# Patient Record
Sex: FEMALE | Race: WHITE | NOT HISPANIC OR LATINO | Employment: FULL TIME | URBAN - METROPOLITAN AREA
[De-identification: names, ages, dates, MRNs, and addresses within clinical notes are randomized per-mention and may not be internally consistent; named-entity substitution may affect disease eponyms.]

---

## 2023-04-28 ENCOUNTER — TELEMEDICINE (OUTPATIENT)
Dept: PRIMARY CARE | Facility: CLINIC | Age: 38
End: 2023-04-28
Payer: COMMERCIAL

## 2023-04-28 DIAGNOSIS — J32.9 RECURRENT SINUSITIS: Primary | ICD-10-CM

## 2023-04-28 PROBLEM — I47.10 SVT (SUPRAVENTRICULAR TACHYCARDIA) (CMS-HCC): Status: ACTIVE | Noted: 2023-04-28

## 2023-04-28 PROCEDURE — 99204 OFFICE O/P NEW MOD 45 MIN: CPT | Performed by: STUDENT IN AN ORGANIZED HEALTH CARE EDUCATION/TRAINING PROGRAM

## 2023-04-28 RX ORDER — NADOLOL 20 MG/1
10 TABLET ORAL DAILY
COMMUNITY
Start: 2021-09-28 | End: 2024-04-08 | Stop reason: SDUPTHER

## 2023-04-28 RX ORDER — AMOXICILLIN AND CLAVULANATE POTASSIUM 875; 125 MG/1; MG/1
875 TABLET, FILM COATED ORAL 2 TIMES DAILY
Qty: 20 TABLET | Refills: 0 | Status: SHIPPED | OUTPATIENT
Start: 2023-04-28 | End: 2023-05-08

## 2023-04-28 ASSESSMENT — ENCOUNTER SYMPTOMS
SINUS PAIN: 1
SINUS PRESSURE: 1
APPETITE CHANGE: 0
SORE THROAT: 0

## 2023-04-28 ASSESSMENT — LIFESTYLE VARIABLES: HISTORY_OF_SMOKING: I HAVE NEVER SMOKED

## 2023-04-28 NOTE — PROGRESS NOTES
Patient Name:  Terri Donald  MRN:  [id number]  :  1985    Subjective   Patient ID: Terri Donald is a 38 y.o. female who presents for No chief complaint on file..    HPI     39 yo female presents with sinus concerns     New patient has not seen primary care through   All histories reviewed with patient  Is establishing with a continuity PCP upcoming     7-10 days of worsening sinus pain and pressure   Negative COVID test  Altered taste and smell   Recurrent in the past  Always treated with z packs    Review of Systems   Constitutional:  Negative for appetite change.   HENT:  Positive for congestion, sinus pressure and sinus pain. Negative for ear pain and sore throat.    Eyes:  Negative for visual disturbance.   Allergic/Immunologic: Negative for immunocompromised state.     Objective   There were no vitals taken for this visit.    Physical Exam  Constitutional:       Appearance: Normal appearance.   HENT:      Head: Normocephalic and atraumatic.      Ears:      Comments: Sinus pressure to palpation     Nose: Congestion present.      Mouth/Throat:      Pharynx: No posterior oropharyngeal erythema.   Eyes:      Extraocular Movements: Extraocular movements intact.   Neurological:      Mental Status: She is alert.     Assessment/Plan   1. Recurrent sinusitis  amoxicillin-pot clavulanate (Augmentin) 875-125 mg tablet        Patient has exhibited symptoms for more than 7 days with worsened features and symptoms including one of the clinical practice guidelines to indicate bacterial sinusitis (purulent nasal drainage, unilateral nasal obstruction, facial pain/pressure, and/or anosmia).In patients with rhinosinusitis, antibiotic therapy is recommended if the above criteria are met.   Patient is to be treated with: augmentin   Patient is advised on recommended duration of treatment, allergies are checked with the patient to assure no hx of reaction, patient advised on possible side effects of this medication.   Patient  may also use symptomatic relief for this condition including nasal saline irrigation, intranasal corticosteroids.

## 2023-05-03 ASSESSMENT — PROMIS GLOBAL HEALTH SCALE
RATE_AVERAGE_PAIN: 0
CARRYOUT_PHYSICAL_ACTIVITIES: COMPLETELY
RATE_QUALITY_OF_LIFE: EXCELLENT
RATE_PHYSICAL_HEALTH: GOOD
EMOTIONAL_PROBLEMS: NEVER
RATE_SOCIAL_SATISFACTION: VERY GOOD
RATE_AVERAGE_FATIGUE: MILD
RATE_MENTAL_HEALTH: VERY GOOD
CARRYOUT_SOCIAL_ACTIVITIES: EXCELLENT
RATE_GENERAL_HEALTH: VERY GOOD

## 2023-05-04 PROBLEM — J32.9 SINUSITIS: Status: RESOLVED | Noted: 2023-04-28 | Resolved: 2023-05-04

## 2023-05-05 ENCOUNTER — OFFICE VISIT (OUTPATIENT)
Dept: PRIMARY CARE | Facility: CLINIC | Age: 38
End: 2023-05-05
Payer: COMMERCIAL

## 2023-05-05 VITALS
DIASTOLIC BLOOD PRESSURE: 68 MMHG | RESPIRATION RATE: 16 BRPM | BODY MASS INDEX: 34.87 KG/M2 | WEIGHT: 217 LBS | TEMPERATURE: 97.3 F | HEART RATE: 74 BPM | OXYGEN SATURATION: 98 % | SYSTOLIC BLOOD PRESSURE: 102 MMHG | HEIGHT: 66 IN

## 2023-05-05 DIAGNOSIS — J01.00 ACUTE NON-RECURRENT MAXILLARY SINUSITIS: ICD-10-CM

## 2023-05-05 DIAGNOSIS — Z00.00 PHYSICAL EXAM: Primary | ICD-10-CM

## 2023-05-05 DIAGNOSIS — I47.10 SVT (SUPRAVENTRICULAR TACHYCARDIA) (CMS-HCC): ICD-10-CM

## 2023-05-05 PROCEDURE — 1036F TOBACCO NON-USER: CPT | Performed by: FAMILY MEDICINE

## 2023-05-05 PROCEDURE — 3008F BODY MASS INDEX DOCD: CPT | Performed by: FAMILY MEDICINE

## 2023-05-05 PROCEDURE — 99214 OFFICE O/P EST MOD 30 MIN: CPT | Performed by: FAMILY MEDICINE

## 2023-05-05 NOTE — PROGRESS NOTES
"Subjective   Patient ID: Terri Donald is a 38 y.o. female who presents for Establish Care (Currently on Augmentin).  Covid vax: x 2  pap:  per pt  Lmp: now    Took methotrexate for ectopic  2021 covid mild covid    HPI  Patient Active Problem List   Diagnosis    SVT (supraventricular tachycardia) (CMS/[facility])       Past Surgical History:   Procedure Laterality Date     SECTION, CLASSIC      2017    NASAL SEPTUM SURGERY         Review of Systems no sz no mi no cva    This patient has   NO history of recent Covid nor flu symptoms,  NO Fever nor chills,  NO Chest pain, shortness of breath nor paroxysmal nocturnal dyspnea,  NO Nausea, vomiting, nor diarrhea,  NO Hematochezia nor melena,  NO Dysuria, hematuria, nor new incontinence issues  NO new severe headaches nor neurological complaints,  NO new issues with anxiety nor depression nor new psychiatric complaints,  NO suicidal nor homicidal ideations.     OBJECTIVE:  /68   Pulse 74   Temp 36.3 °C (97.3 °F) (Temporal)   Resp 16   Ht 1.664 m (5' 5.5\")   Wt 98.4 kg (217 lb)   LMP 2023 (Exact Date)   SpO2 98%   BMI 35.56 kg/m²      General:  alert, oriented, no acute distress.  No obvious skin rashes noted.   No gait disturbance noted.    Mood is pleasant, not tearful, no signs of emotional distress.  Not appearing intoxicated or altered.   No voiced delusions,   Normal, appropriate behavior.    HEENT: Normocephalic, atraumatic,   Pupils round, reactive to light  Extraocular motions intact and wnl  Tympanic membranes normal    Neck: no nuchal rigidity  No masses palpable.  No carotid bruits.  No thyromegaly.    Respiratory: Equal breath sounds  No wheezes,    rales,    nor rhonchi  No respiratory distress.    Heart: Regular rate and rhythm, no    murmurs  no rubs/gallops    Abdomen: no masses palpable, nontender, no rebound nor guarding .    Extremities: NO cyanosis noted, no clubbing.   No edema noted.  2+dorsalis pedis " pulses.    Normal-not antalgic, steady gait.    No visits with results within 3 Month(s) from this visit.   Latest known visit with results is:   Legacy Encounter on 06/07/2021   Component Date Value Ref Range Status    hCG Quantitative 06/07/2021 <3  IU/L Final    Comment:  Low-level positive HCG results can be seen in early pregnancy, in    tonny- or post-menopausal females due to normal pituitary HCG    production, or with analytic interference. Repeat testing in 48-72   hours can aid in assessing for pregnancy as results should double   in this time period. FSH measurement is recommended in tonny- or   post-menopausal females as concurrent elevation of FSH can support    pituitary production as the source of the HCG elevation.   .   Total HCG measurement is performed using the [employer]   immunoassay which detects intact HCG and free beta HCG subunit.   This test is not indicated for use as a tumor marker.   HCG testing is performed using a different test methodology at [facility] than other [facility]. Direct result comparison   should only be made within the same method.         REF VALUES  NONPREGNANT FEMALE <5  MALES              <5          Assessment/Plan     Problem List Items Addressed This Visit          Circulatory    SVT (supraventricular tachycardia) (CMS/HCC)     Other Visit Diagnoses       Physical exam    -  Primary    Acute non-recurrent maxillary sinusitis                Sees dr rapp and dr ISHA webb [facility] general  Echo planned  Labs due  The patient is aware that results will be forthcoming of ALL planned labs and or tests. If no results are received on my chart or by letter within 1 - 3 weeks, the patient is aware they need to call to obtain results as this is not usual.

## 2023-10-20 ENCOUNTER — OFFICE VISIT (OUTPATIENT)
Dept: PRIMARY CARE | Facility: CLINIC | Age: 38
End: 2023-10-20
Payer: COMMERCIAL

## 2023-10-20 VITALS
DIASTOLIC BLOOD PRESSURE: 77 MMHG | TEMPERATURE: 98.6 F | BODY MASS INDEX: 34.27 KG/M2 | HEIGHT: 66 IN | RESPIRATION RATE: 16 BRPM | WEIGHT: 213.2 LBS | HEART RATE: 81 BPM | SYSTOLIC BLOOD PRESSURE: 108 MMHG | OXYGEN SATURATION: 98 %

## 2023-10-20 DIAGNOSIS — J20.9 ACUTE BRONCHITIS, UNSPECIFIED ORGANISM: ICD-10-CM

## 2023-10-20 DIAGNOSIS — J06.9 UPPER RESPIRATORY TRACT INFECTION, UNSPECIFIED TYPE: Primary | ICD-10-CM

## 2023-10-20 DIAGNOSIS — J40 BRONCHITIS: ICD-10-CM

## 2023-10-20 PROCEDURE — 3008F BODY MASS INDEX DOCD: CPT | Performed by: PHYSICIAN ASSISTANT

## 2023-10-20 PROCEDURE — 1036F TOBACCO NON-USER: CPT | Performed by: PHYSICIAN ASSISTANT

## 2023-10-20 PROCEDURE — 87634 RSV DNA/RNA AMP PROBE: CPT

## 2023-10-20 PROCEDURE — 87636 SARSCOV2 & INF A&B AMP PRB: CPT

## 2023-10-20 PROCEDURE — 99213 OFFICE O/P EST LOW 20 MIN: CPT | Performed by: PHYSICIAN ASSISTANT

## 2023-10-20 RX ORDER — AMOXICILLIN AND CLAVULANATE POTASSIUM 875; 125 MG/1; MG/1
875 TABLET, FILM COATED ORAL
Qty: 20 TABLET | Refills: 0 | Status: SHIPPED | OUTPATIENT
Start: 2023-10-20 | End: 2023-10-30

## 2023-10-20 ASSESSMENT — ENCOUNTER SYMPTOMS
SINUS PRESSURE: 1
SORE THROAT: 1
SINUS PAIN: 1

## 2023-10-20 NOTE — PROGRESS NOTES
"Subjective   Patient ID: Terri Donald is a 38 y.o. female who presents for Illness (Dr. Lee patient is being seen today for possible bronchitis. Patient is concerned that could be turning into pneumonia. Symptoms are cough/spitting up phlegm (yellowish-green), sore throat, runny nose, and drainage back of throat. ).    HPI     URI sxs:   Last a couple days coughing and bringing up yellow mucous   Was sick last week as well   No fever   No known exposures   But, her son in school   No SOB but sometimes heaviness  Had pneumonia back in 2016 and it felt like this in her chest   Txs tried Alyson seltzer cold   Sore throat  PND     Review of Systems   HENT:  Positive for postnasal drip, sinus pressure, sinus pain and sore throat.        Objective   /77 (BP Location: Left arm, Patient Position: Sitting)   Pulse 81   Temp 37 °C (98.6 °F)   Resp 16   Ht 1.676 m (5' 6\")   Wt 96.7 kg (213 lb 3.2 oz)   LMP 10/13/2023 (Exact Date) Comment: currently on- ending  SpO2 98%   BMI 34.41 kg/m²     Physical Exam  Vitals reviewed.   Constitutional:       General: She is not in acute distress.     Appearance: Normal appearance. She is not toxic-appearing.   HENT:      Right Ear: Tympanic membrane and ear canal normal.      Left Ear: Tympanic membrane and ear canal normal.      Nose: Congestion present.   Eyes:      Conjunctiva/sclera: Conjunctivae normal.   Cardiovascular:      Rate and Rhythm: Normal rate and regular rhythm.   Pulmonary:      Effort: Pulmonary effort is normal.      Breath sounds: No wheezing or rhonchi.   Lymphadenopathy:      Cervical: No cervical adenopathy.   Neurological:      Mental Status: She is alert.         Assessment/Plan   Problem List Items Addressed This Visit    None  Visit Diagnoses         Codes    Upper respiratory tract infection, unspecified type    -  Primary J06.9    Relevant Medications    amoxicillin-pot clavulanate (Augmentin) 875-125 mg tablet    Other Relevant Orders    " Sars-CoV-2 PCR, Symptomatic    Influenza A, and B PCR    Acute bronchitis, unspecified organism     J20.9    Relevant Medications    amoxicillin-pot clavulanate (Augmentin) 875-125 mg tablet    Bronchitis     J40    Relevant Orders    RSV PCR

## 2023-10-21 LAB
FLUAV RNA RESP QL NAA+PROBE: NOT DETECTED
FLUBV RNA RESP QL NAA+PROBE: NOT DETECTED
RSV RNA RESP QL NAA+PROBE: NOT DETECTED
SARS-COV-2 RNA RESP QL NAA+PROBE: NOT DETECTED

## 2024-03-21 ENCOUNTER — OFFICE VISIT (OUTPATIENT)
Dept: OBSTETRICS AND GYNECOLOGY | Facility: CLINIC | Age: 39
End: 2024-03-21
Payer: COMMERCIAL

## 2024-03-21 VITALS
SYSTOLIC BLOOD PRESSURE: 126 MMHG | WEIGHT: 220.5 LBS | BODY MASS INDEX: 43.29 KG/M2 | HEIGHT: 60 IN | DIASTOLIC BLOOD PRESSURE: 84 MMHG

## 2024-03-21 DIAGNOSIS — Z01.419 WELL FEMALE EXAM WITH ROUTINE GYNECOLOGICAL EXAM: ICD-10-CM

## 2024-03-21 PROCEDURE — 88175 CYTOPATH C/V AUTO FLUID REDO: CPT

## 2024-03-21 PROCEDURE — 99385 PREV VISIT NEW AGE 18-39: CPT | Performed by: OBSTETRICS & GYNECOLOGY

## 2024-03-21 PROCEDURE — 87661 TRICHOMONAS VAGINALIS AMPLIF: CPT

## 2024-03-21 PROCEDURE — 87624 HPV HI-RISK TYP POOLED RSLT: CPT

## 2024-03-21 PROCEDURE — 1036F TOBACCO NON-USER: CPT | Performed by: OBSTETRICS & GYNECOLOGY

## 2024-03-21 PROCEDURE — 88141 CYTOPATH C/V INTERPRET: CPT | Performed by: PATHOLOGY

## 2024-03-21 PROCEDURE — 87800 DETECT AGNT MULT DNA DIREC: CPT

## 2024-03-21 PROCEDURE — 3008F BODY MASS INDEX DOCD: CPT | Performed by: OBSTETRICS & GYNECOLOGY

## 2024-03-21 ASSESSMENT — PATIENT HEALTH QUESTIONNAIRE - PHQ9
1. LITTLE INTEREST OR PLEASURE IN DOING THINGS: NOT AT ALL
SUM OF ALL RESPONSES TO PHQ9 QUESTIONS 1 & 2: 0
2. FEELING DOWN, DEPRESSED OR HOPELESS: NOT AT ALL

## 2024-03-21 NOTE — PROGRESS NOTES
Subjective   Patient ID: Terri Donald is a 39 y.o. female who presents for Annual Exam.    Last pap: 6/14/22 at [facility] normal    3/11/16 Ascus HPV+  [place] 7/8/16 Lgsil kirstie at [facility]. LEEP in 2008     Last mamm: N/A  LMP:03/09/2024   Contraception: none  Sexually active: not at this time  Self breast exam: sometimes  Diet:not very balanced  Exercise: yes    HPI  Doing well. No complaints.     Review of Systems  Neg   Objective   Physical Exam  Physical Exam         Appearance: Normal appearance. Affect normal and alert  Pulmonary:      Effort: Pulmonary effort is normal. Breath sounds clear  Skin: no rashes or lesions   Breasts:     Breasts bilaterally are symmetrical. No masses or axillary adenopathy. No skin or nipple changes    Abdominal:     Abdomen is flat, soft, nontender. No distension. No mass palpated.      Genitourinary:     Labia: no skin lesions or rash       Urethra: No lesions.      Bladder with no prolapse     Vagina: No discharge, mucosa is pink with no lesions.     Cervix:    mobile and nontender no discharge     Uterus:   Not enlarged, small, nontender.      Adnexa: Bilaterally with  No mass or tenderness.            Extremities:  Nontender, no edema. Normal range of motion    Assessment/Plan   Diagnoses and all orders for this visit:  Well female exam with routine gynecological exam  -     THINPREP PAP TEST    Wants GC CHLY screen also.    MD Coby Darnell, ADELA 03/21/24 2:46 PM

## 2024-03-26 LAB
C TRACH RRNA SPEC QL NAA+PROBE: NEGATIVE
N GONORRHOEA DNA SPEC QL PROBE+SIG AMP: NEGATIVE
T VAGINALIS RRNA SPEC QL NAA+PROBE: NEGATIVE

## 2024-04-03 LAB
CYTOLOGY CMNT CVX/VAG CYTO-IMP: NORMAL
HPV HR 12 DNA GENITAL QL NAA+PROBE: NEGATIVE
HPV HR GENOTYPES PNL CVX NAA+PROBE: POSITIVE
HPV16 DNA SPEC QL NAA+PROBE: NEGATIVE
HPV18 DNA SPEC QL NAA+PROBE: POSITIVE
LAB AP HPV GENOTYPE QUESTION: YES
LAB AP HPV HR: NORMAL
LAB AP PAP ADDITIONAL TESTS: NORMAL
LABORATORY COMMENT REPORT: NORMAL
PATH REPORT.TOTAL CANCER: NORMAL

## 2024-04-04 PROBLEM — B97.7 HPV (HUMAN PAPILLOMA VIRUS) INFECTION: Status: ACTIVE | Noted: 2024-04-04

## 2024-04-08 ENCOUNTER — LAB (OUTPATIENT)
Dept: LAB | Facility: LAB | Age: 39
End: 2024-04-08
Payer: COMMERCIAL

## 2024-04-08 ENCOUNTER — OFFICE VISIT (OUTPATIENT)
Dept: PRIMARY CARE | Facility: CLINIC | Age: 39
End: 2024-04-08
Payer: COMMERCIAL

## 2024-04-08 VITALS
TEMPERATURE: 96.7 F | SYSTOLIC BLOOD PRESSURE: 112 MMHG | WEIGHT: 222.6 LBS | OXYGEN SATURATION: 98 % | HEART RATE: 81 BPM | HEIGHT: 66 IN | RESPIRATION RATE: 16 BRPM | DIASTOLIC BLOOD PRESSURE: 76 MMHG | BODY MASS INDEX: 35.77 KG/M2

## 2024-04-08 DIAGNOSIS — I47.11 INAPPROPRIATE SINUS TACHYCARDIA (CMS-HCC): ICD-10-CM

## 2024-04-08 DIAGNOSIS — I47.10 SVT (SUPRAVENTRICULAR TACHYCARDIA) (CMS-HCC): ICD-10-CM

## 2024-04-08 DIAGNOSIS — Z00.00 PHYSICAL EXAM: Primary | ICD-10-CM

## 2024-04-08 DIAGNOSIS — Z00.00 PHYSICAL EXAM: ICD-10-CM

## 2024-04-08 LAB
ALBUMIN SERPL BCP-MCNC: 4.4 G/DL (ref 3.4–5)
ALP SERPL-CCNC: 67 U/L (ref 33–110)
ALT SERPL W P-5'-P-CCNC: 16 U/L (ref 7–45)
ANION GAP SERPL CALC-SCNC: 11 MMOL/L (ref 10–20)
AST SERPL W P-5'-P-CCNC: 16 U/L (ref 9–39)
BASOPHILS # BLD AUTO: 0.03 X10*3/UL (ref 0–0.1)
BASOPHILS NFR BLD AUTO: 0.5 %
BILIRUB SERPL-MCNC: 0.5 MG/DL (ref 0–1.2)
BUN SERPL-MCNC: 9 MG/DL (ref 6–23)
CALCIUM SERPL-MCNC: 9.6 MG/DL (ref 8.6–10.3)
CHLORIDE SERPL-SCNC: 105 MMOL/L (ref 98–107)
CHOLEST SERPL-MCNC: 207 MG/DL (ref 0–199)
CHOLESTEROL/HDL RATIO: 4.3
CO2 SERPL-SCNC: 27 MMOL/L (ref 21–32)
CREAT SERPL-MCNC: 0.7 MG/DL (ref 0.5–1.05)
EGFRCR SERPLBLD CKD-EPI 2021: >90 ML/MIN/1.73M*2
EOSINOPHIL # BLD AUTO: 0.08 X10*3/UL (ref 0–0.7)
EOSINOPHIL NFR BLD AUTO: 1.3 %
ERYTHROCYTE [DISTWIDTH] IN BLOOD BY AUTOMATED COUNT: 13.2 % (ref 11.5–14.5)
EST. AVERAGE GLUCOSE BLD GHB EST-MCNC: 114 MG/DL
GLUCOSE SERPL-MCNC: 94 MG/DL (ref 74–99)
HBA1C MFR BLD: 5.6 %
HCT VFR BLD AUTO: 39.9 % (ref 36–46)
HDLC SERPL-MCNC: 48.2 MG/DL
HGB BLD-MCNC: 13.3 G/DL (ref 12–16)
IMM GRANULOCYTES # BLD AUTO: 0.01 X10*3/UL (ref 0–0.7)
IMM GRANULOCYTES NFR BLD AUTO: 0.2 % (ref 0–0.9)
LDLC SERPL CALC-MCNC: 125 MG/DL
LYMPHOCYTES # BLD AUTO: 1.6 X10*3/UL (ref 1.2–4.8)
LYMPHOCYTES NFR BLD AUTO: 25.2 %
MCH RBC QN AUTO: 30.9 PG (ref 26–34)
MCHC RBC AUTO-ENTMCNC: 33.3 G/DL (ref 32–36)
MCV RBC AUTO: 93 FL (ref 80–100)
MONOCYTES # BLD AUTO: 0.25 X10*3/UL (ref 0.1–1)
MONOCYTES NFR BLD AUTO: 3.9 %
NEUTROPHILS # BLD AUTO: 4.38 X10*3/UL (ref 1.2–7.7)
NEUTROPHILS NFR BLD AUTO: 68.9 %
NON HDL CHOLESTEROL: 159 MG/DL (ref 0–149)
NRBC BLD-RTO: 0 /100 WBCS (ref 0–0)
PLATELET # BLD AUTO: 205 X10*3/UL (ref 150–450)
POTASSIUM SERPL-SCNC: 4.4 MMOL/L (ref 3.5–5.3)
PROT SERPL-MCNC: 7.7 G/DL (ref 6.4–8.2)
RBC # BLD AUTO: 4.31 X10*6/UL (ref 4–5.2)
SODIUM SERPL-SCNC: 139 MMOL/L (ref 136–145)
T4 FREE SERPL-MCNC: 0.69 NG/DL (ref 0.61–1.12)
TRIGL SERPL-MCNC: 170 MG/DL (ref 0–149)
TSH SERPL-ACNC: 1.91 MIU/L (ref 0.44–3.98)
VLDL: 34 MG/DL (ref 0–40)
WBC # BLD AUTO: 6.4 X10*3/UL (ref 4.4–11.3)

## 2024-04-08 PROCEDURE — 99395 PREV VISIT EST AGE 18-39: CPT | Performed by: FAMILY MEDICINE

## 2024-04-08 PROCEDURE — 85025 COMPLETE CBC W/AUTO DIFF WBC: CPT

## 2024-04-08 PROCEDURE — 3008F BODY MASS INDEX DOCD: CPT | Performed by: FAMILY MEDICINE

## 2024-04-08 PROCEDURE — 83036 HEMOGLOBIN GLYCOSYLATED A1C: CPT

## 2024-04-08 PROCEDURE — 1036F TOBACCO NON-USER: CPT | Performed by: FAMILY MEDICINE

## 2024-04-08 PROCEDURE — 84439 ASSAY OF FREE THYROXINE: CPT

## 2024-04-08 PROCEDURE — 36415 COLL VENOUS BLD VENIPUNCTURE: CPT

## 2024-04-08 PROCEDURE — 80053 COMPREHEN METABOLIC PANEL: CPT

## 2024-04-08 PROCEDURE — 80061 LIPID PANEL: CPT

## 2024-04-08 PROCEDURE — 84443 ASSAY THYROID STIM HORMONE: CPT

## 2024-04-08 RX ORDER — SEMAGLUTIDE 0.25 MG/.5ML
0.25 INJECTION, SOLUTION SUBCUTANEOUS
Qty: 2 ML | Refills: 3 | Status: SHIPPED | OUTPATIENT
Start: 2024-04-14 | End: 2024-04-09 | Stop reason: SDUPTHER

## 2024-04-08 RX ORDER — NADOLOL 20 MG/1
10 TABLET ORAL DAILY
Qty: 45 TABLET | Refills: 3 | Status: SHIPPED | OUTPATIENT
Start: 2024-04-08

## 2024-04-08 RX ORDER — AMOXICILLIN AND CLAVULANATE POTASSIUM 875; 125 MG/1; MG/1
1 TABLET, FILM COATED ORAL 2 TIMES DAILY
COMMUNITY
Start: 2024-03-28 | End: 2024-04-08 | Stop reason: ALTCHOICE

## 2024-04-08 NOTE — PROGRESS NOTES
Yearly check up.      C19: Moderna x 2   Flu: DECLINE   PAP: 03/2024- WNL, HPV POSITIVE  LMP: 03/09/2024

## 2024-04-08 NOTE — PROGRESS NOTES
"Subjective   Patient ID: Terri Donald is a 39 y.o. female who presents for CHECK UP (Patient states no questions or concerns at this time, here for yearly check up. ).  Had remote svt  And inappro sinus tachy    HPI  Patient Active Problem List   Diagnosis    SVT (supraventricular tachycardia) (CMS/HCC)    HPV (human papilloma virus) infection       Past Surgical History:   Procedure Laterality Date    CERVICAL BIOPSY  W/ LOOP ELECTRODE EXCISION       SECTION, CLASSIC  2017     SECTION, LOW TRANSVERSE  2017    COLPOSCOPY  2018    NASAL SEPTUM SURGERY  2012       Review of Systems  This patient has  NO history of seizures/ CAD or CVA    NO history of recent Covid nor flu symptoms,  NO Fever nor chills,  NO Chest pain, shortness of breath nor paroxysmal nocturnal dyspnea,  NO Nausea, vomiting, nor diarrhea,  NO Hematochezia nor melena,  NO Dysuria, hematuria, nor new incontinence issues  NO new severe headaches nor neurological complaints,  NO new issues with anxiety nor depression nor new psychiatric complaints,  NO suicidal nor homicidal ideations.     OBJECTIVE:  /76 (BP Location: Left arm, Patient Position: Sitting, BP Cuff Size: Large adult)   Pulse 81   Temp 35.9 °C (96.7 °F)   Resp 16   Ht 1.676 m (5' 6\")   Wt 101 kg (222 lb 9.6 oz)   LMP 2023 (Exact Date)   SpO2 98%   BMI 35.93 kg/m²      General:  alert, oriented, no acute distress.  No obvious skin rashes noted.   No gait disturbance noted.    Mood is pleasant,  no signs of emotional distress.   Not appearing intoxicated or altered.   No voiced delusions,   Normal, appropriate behavior.    HEENT: Normocephalic, atraumatic,   Pupils round, reactive to light  Extraocular motions intact and wnl  Tympanic membranes normal    Neck: no nuchal rigidity  No masses palpable.  No carotid bruits.  No thyromegaly.    Respiratory: Equal breath sounds  No wheezes,    rales,    nor rhonchi  No respiratory distress.    Heart: " Regular rate and rhythm, no    murmurs  no rubs/gallops    Abdomen: no masses palpable, nontender, no rebound nor guarding.  overwt  Extremities: NO cyanosis noted, no clubbing.   No edema noted.  2+dorsalis pedis pulses.    Normal-not antalgic, steady gait.    Office Visit on 2024   Component Date Value Ref Range Status    Case Report 2024    Final                    Value:Gynecologic Cytology                              Case: [id number]                                   Authorizing Provider:  Amelia Humphrey MD     Collected:           2024 1521              Ordering Location[facility]     [facility]       Received:            2024 1521              First Screen:          Darinel Starr, CT                                                    Pathologist:           Claudia Marroquin MD                                                         Specimen:    ThinPrep Liquid-Based Pap-Imaging System Screen, CERVIX, SCREENING                         Final Cytological Interpretation 2024    Final                    Value:This result contains rich text formatting which cannot be displayed here.      2024    Final                    Value:This result contains rich text formatting which cannot be displayed here.    ThinPrep Imaging System 2024    Final                    Value:This result contains rich text formatting which cannot be displayed here.    Educational Note 2024    Final                    Value:This result contains rich text formatting which cannot be displayed here.    Perform HPV HR test? 2024 Always (all interpretations)   Final    Include HPV Genotype? 2024 Yes   Final    Additional Testin2024 Trichomonas  Chlamydia + Gonorrhea   Final    HPV, high-risk 2024 Positive (A)  Negative Final    HPV Type 16 DNA 2024 Negative  Negative Final    HPV Type 18 DNA 2024 Positive (A)  Negative Final    HPV non-Type 16 or 18  DNA 03/21/2024 Negative  Negative Final    Neisseria gonorrhea,Amplified 03/21/2024 Negative  Negative Final    Chlamydia trachomatis, Amplified 03/21/2024 Negative  Negative Final    Trichomonas Vaginalis 03/21/2024 Negative  Negative, Invalid, TRICH neg Final        Assessment/Plan     Problem List Items Addressed This Visit       SVT (supraventricular tachycardia) (CMS/[facility])    Relevant Medications    nadolol (Corgard) 20 mg tablet    Other Relevant Orders    Comprehensive Metabolic Panel    CBC and Auto Differential    Hemoglobin A1C    Lipid Panel    Thyroid Stimulating Hormone    Thyroxine, Free     Other Visit Diagnoses       Inappropriate sinus tachycardia (CMS/[facility])    -  Primary    Relevant Medications    nadolol (Corgard) 20 mg tablet    Other Relevant Orders    Comprehensive Metabolic Panel    CBC and Auto Differential    Hemoglobin A1C    Lipid Panel    Thyroid Stimulating Hormone    Thyroxine, Free    Physical exam        Relevant Orders    Comprehensive Metabolic Panel    CBC and Auto Differential    Hemoglobin A1C    Lipid Panel    Thyroid Stimulating Hormone    Thyroxine, Free    BMI 35.0-35.9,adult        Relevant Medications    semaglutide, weight loss, (Wegovy) 0.25 mg/0.5 mL pen injector (Start on 4/14/2024)    Other Relevant Orders    Comprehensive Metabolic Panel    CBC and Auto Differential    Hemoglobin A1C    Lipid Panel    Thyroid Stimulating Hormone    Thyroxine, Free            Follow up at next scheduled visit 3-4mo  Wegovy This medications risks, benefits, and alternatives were discussed with patient at length.  If any unwanted side effects occur-discontinue medicine and call the office for discussion.  Labs now  The patient is aware that results will be forthcoming of ALL planned labs and or tests. If no results are received on my chart or by letter within 1 - 3 weeks, the patient is aware they need to call to obtain results, as this is not usual. Also, if any new conditions arise, or  current condition worsens, it is understood that sooner appointment should be made or urgent care/convenient care or emergency room treatment should be sought depending on severity. Otherwise follow up for recheck at regular intervals as we have discussed, at least yearly.

## 2024-04-09 ENCOUNTER — TELEPHONE (OUTPATIENT)
Dept: OBSTETRICS AND GYNECOLOGY | Facility: CLINIC | Age: 39
End: 2024-04-09
Payer: COMMERCIAL

## 2024-04-09 ENCOUNTER — PATIENT MESSAGE (OUTPATIENT)
Dept: PRIMARY CARE | Facility: CLINIC | Age: 39
End: 2024-04-09
Payer: COMMERCIAL

## 2024-04-09 RX ORDER — SEMAGLUTIDE 0.25 MG/.5ML
0.25 INJECTION, SOLUTION SUBCUTANEOUS
Qty: 2 ML | Refills: 3 | Status: SHIPPED | OUTPATIENT
Start: 2024-04-14 | End: 2024-07-29

## 2024-04-09 NOTE — TELEPHONE ENCOUNTER
From: Terri Donald  To: Coby Lee MD  Sent: 4/9/2024 8:19 AM EDT  Subject: Change pharmacy     Good morning,    [facility] not carry the wegovy. Could you send the prescription to [address]     Thank you,    Terri Donald

## 2024-04-09 NOTE — TELEPHONE ENCOUNTER
----- Message from Amelia Humphrey MD sent at 4/4/2024 10:58 AM EDT -----  Regarding: colp    Please call this patient. Her PAP results were abnormal for HPV 18 . She needs to make an appointment for a colposcopy.        Amelia Humphrey MD

## 2024-04-11 NOTE — RESULT ENCOUNTER NOTE
Please call this patient. Her HPV 18 + on  PAP results.  She needs to make an appointment for a colposcopy.        Amelia Humphrey MD

## 2024-04-15 ENCOUNTER — TELEPHONE (OUTPATIENT)
Dept: OBSTETRICS AND GYNECOLOGY | Facility: CLINIC | Age: 39
End: 2024-04-15
Payer: COMMERCIAL

## 2024-04-15 NOTE — TELEPHONE ENCOUNTER
----- Message from Amelia Humphrey MD sent at 4/11/2024  8:36 AM EDT -----    Please call this patient. Her HPV 18 + on  PAP results.  She needs to make an appointment for a colposcopy.        Amelia Humphrey MD

## 2024-04-22 ENCOUNTER — APPOINTMENT (OUTPATIENT)
Dept: OBSTETRICS AND GYNECOLOGY | Facility: CLINIC | Age: 39
End: 2024-04-22
Payer: COMMERCIAL

## 2024-05-09 ENCOUNTER — APPOINTMENT (OUTPATIENT)
Dept: OBSTETRICS AND GYNECOLOGY | Facility: CLINIC | Age: 39
End: 2024-05-09
Payer: COMMERCIAL

## 2024-06-04 ENCOUNTER — APPOINTMENT (OUTPATIENT)
Dept: OBSTETRICS AND GYNECOLOGY | Facility: CLINIC | Age: 39
End: 2024-06-04
Payer: COMMERCIAL

## 2024-06-17 ENCOUNTER — APPOINTMENT (OUTPATIENT)
Dept: OBSTETRICS AND GYNECOLOGY | Facility: CLINIC | Age: 39
End: 2024-06-17
Payer: COMMERCIAL

## 2024-06-17 VITALS
SYSTOLIC BLOOD PRESSURE: 106 MMHG | DIASTOLIC BLOOD PRESSURE: 62 MMHG | WEIGHT: 214.8 LBS | BODY MASS INDEX: 34.52 KG/M2 | HEIGHT: 66 IN

## 2024-06-17 DIAGNOSIS — B97.7 HPV IN FEMALE: Primary | ICD-10-CM

## 2024-06-17 LAB — PREGNANCY TEST URINE, POC: NEGATIVE

## 2024-06-17 PROCEDURE — 57454 BX/CURETT OF CERVIX W/SCOPE: CPT | Performed by: OBSTETRICS & GYNECOLOGY

## 2024-06-17 PROCEDURE — 99213 OFFICE O/P EST LOW 20 MIN: CPT | Performed by: OBSTETRICS & GYNECOLOGY

## 2024-06-17 PROCEDURE — 88305 TISSUE EXAM BY PATHOLOGIST: CPT | Performed by: STUDENT IN AN ORGANIZED HEALTH CARE EDUCATION/TRAINING PROGRAM

## 2024-06-17 PROCEDURE — 88305 TISSUE EXAM BY PATHOLOGIST: CPT

## 2024-06-17 PROCEDURE — 81025 URINE PREGNANCY TEST: CPT | Performed by: OBSTETRICS & GYNECOLOGY

## 2024-06-28 LAB
LABORATORY COMMENT REPORT: NORMAL
PATH REPORT.FINAL DX SPEC: NORMAL
PATH REPORT.GROSS SPEC: NORMAL
PATH REPORT.RELEVANT HX SPEC: NORMAL
PATH REPORT.TOTAL CANCER: NORMAL

## 2025-03-05 ENCOUNTER — ANCILLARY PROCEDURE (OUTPATIENT)
Dept: URGENT CARE | Age: 40
End: 2025-03-05
Payer: COMMERCIAL

## 2025-03-05 ENCOUNTER — OFFICE VISIT (OUTPATIENT)
Dept: URGENT CARE | Age: 40
End: 2025-03-05
Payer: COMMERCIAL

## 2025-03-05 VITALS
DIASTOLIC BLOOD PRESSURE: 80 MMHG | SYSTOLIC BLOOD PRESSURE: 115 MMHG | OXYGEN SATURATION: 99 % | RESPIRATION RATE: 20 BRPM | TEMPERATURE: 97.4 F | HEIGHT: 66 IN | HEART RATE: 86 BPM | WEIGHT: 200 LBS | BODY MASS INDEX: 32.14 KG/M2

## 2025-03-05 DIAGNOSIS — S90.811A ABRASION, RIGHT FOOT, INITIAL ENCOUNTER: ICD-10-CM

## 2025-03-05 DIAGNOSIS — S93.602A FOOT SPRAIN, LEFT, INITIAL ENCOUNTER: Primary | ICD-10-CM

## 2025-03-05 DIAGNOSIS — S99.922A FOOT INJURY, LEFT, INITIAL ENCOUNTER: ICD-10-CM

## 2025-03-05 DIAGNOSIS — S99.912A INJURY OF LEFT ANKLE, INITIAL ENCOUNTER: ICD-10-CM

## 2025-03-05 PROCEDURE — 73630 X-RAY EXAM OF FOOT: CPT | Mod: LEFT SIDE

## 2025-03-05 PROCEDURE — A6449 LT COMPRES BAND >=3" <5"/YD: HCPCS

## 2025-03-05 PROCEDURE — 1036F TOBACCO NON-USER: CPT

## 2025-03-05 PROCEDURE — 73610 X-RAY EXAM OF ANKLE: CPT | Mod: LEFT SIDE

## 2025-03-05 PROCEDURE — 90471 IMMUNIZATION ADMIN: CPT

## 2025-03-05 PROCEDURE — 3008F BODY MASS INDEX DOCD: CPT

## 2025-03-05 PROCEDURE — 99203 OFFICE O/P NEW LOW 30 MIN: CPT

## 2025-03-05 PROCEDURE — 90715 TDAP VACCINE 7 YRS/> IM: CPT

## 2025-03-05 RX ORDER — BACITRACIN ZINC 500 UNIT/G
1 OINTMENT IN PACKET (EA) TOPICAL ONCE
Status: COMPLETED | OUTPATIENT
Start: 2025-03-05 | End: 2025-03-05

## 2025-03-05 RX ADMIN — Medication 1 APPLICATION: at 10:56

## 2025-03-05 NOTE — PATIENT INSTRUCTIONS
Rest your foot as much as possible for the next 2-3 days  Ice - several times daily for 15-20 minutes with barrier between skin and ice pack  Compression with ace wrap  Elevate ankle above your heart while sitting/lying  Acetaminophen/ibuprofen for pain  I have updated your tetanus immunization  Please monitor your right foot for signs of infection like redness, swelling, pain, or pus draining from the abrasion  Clean with antibacterial soap like dial and apply a topical antibiotic ointment twice daily  Seek care if any of these symptoms develop  Return if any new or worsening symptoms  Follow-up with ortho if no improvement

## 2025-03-05 NOTE — PROGRESS NOTES
"Subjective   Patient ID: Terri Donald is a 40 y.o. female. They present today with a chief complaint of Injury (Post fall today, sts and pain L ankle/foot since ).    History of Present Illness  Subjective  Terri Donald is a 40 y.o. female who presents with bilateral foot pain. Onset of the symptoms was today. Precipitating event: injured while falling and tripping on a step. Current symptoms include: ability to bear weight, but with some pain, bruising, pain at the lateral aspect of the left foot, pain with inversion of the foot, and swelling. Aggravating factors: any weight bearing and walking. Symptoms have progressed to a point and plateaued. She also suffered an abrasion to the ventral surface of the right foot.  The right foot is without significant pain, swelling, or bruising. Patient has had no prior foot problems. Evaluation to date: none. Treatment to date: none. 2017 was the last time tetanus was updated per report.     Review of Systems   HEENT/Neck: Denies head or neck injury/pain  Constitutional:  Denies fever, chills, malaise, fatigue    Musculoskeletal:  See HPI   Integumentary:  Endorses wound and bruising.     Neurologic:  Denies numbness, weakness, paresthesia    All other systems are negative    Objective  /80   Pulse 86   Temp 36.3 °C (97.4 °F)   Resp 20   Ht 1.676 m (5' 6\")   Wt 90.7 kg (200 lb)   LMP 02/02/2025 (Approximate)   SpO2 99%   BMI 32.28 kg/m²   Right foot:  normal exam, no swelling, tenderness, instability; ligaments intact, full range of motion of all ankle/foot joints, 4 cm by 2 cm abrasion noted to the ventral surface of the mid-foot without active bleeding.   Left foot:  soft tissue swelling noted over the 4th, 5th proximal metacarpals and cuboid bone,  tenderness of the 5th metatarsal , and normal microcirculation and capillary reflow, pain with foot inversion        History provided by:  Patient   used: No    Injury      Past Medical " "History  Allergies as of 2025 - Reviewed 2025   Allergen Reaction Noted    Bactrim [sulfamethoxazole-trimethoprim] Other 2023       (Not in a hospital admission)       Past Medical History:   Diagnosis Date    Abnormal Pap smear of cervix 2018    Anemia     Ectopic pregnancy     Pap test, as part of routine gynecological examination 2024    wnl, hpv POSITIVE-gyn       Past Surgical History:   Procedure Laterality Date    CERVICAL BIOPSY  W/ LOOP ELECTRODE EXCISION       SECTION, CLASSIC  2017     SECTION, LOW TRANSVERSE  2017    COLPOSCOPY  2018    NASAL SEPTUM SURGERY          reports that she has never smoked. She has never used smokeless tobacco. She reports that she does not currently use alcohol. She reports that she does not use drugs.    Review of Systems  Review of Systems                               Objective    Vitals:    25 1032   BP: 115/80   Pulse: 86   Resp: 20   Temp: 36.3 °C (97.4 °F)   SpO2: 99%   Weight: 90.7 kg (200 lb)   Height: 1.676 m (5' 6\")     Patient's last menstrual period was 2025 (approximate).    Physical Exam  Vitals and nursing note reviewed.   Constitutional:       General: She is not in acute distress.     Appearance: Normal appearance. She is normal weight. She is not ill-appearing, toxic-appearing or diaphoretic.   Cardiovascular:      Rate and Rhythm: Normal rate.   Pulmonary:      Effort: Pulmonary effort is normal.   Musculoskeletal:         General: Normal range of motion.      Left ankle: No swelling or ecchymosis. No tenderness. Normal range of motion. Anterior drawer test negative. Normal pulse.   Skin:     General: Skin is warm and dry.      Capillary Refill: Capillary refill takes less than 2 seconds.      Coloration: Skin is not ashen, cyanotic, jaundiced, mottled, pale or sallow.      Findings: Abrasion, signs of injury and wound present. No abscess, bruising, ecchymosis, erythema, laceration, petechiae or " rash.      Comments: 4 cm by 2 cm abrasion noted to the ventral surface of the right mid-foot without active bleeding. No FB present.    Neurological:      General: No focal deficit present.      Mental Status: She is alert and oriented to person, place, and time.      Sensory: No sensory deficit.         Wound Care    Date/Time: 3/5/2025 10:50 AM    Performed by: CORWIN Scott  Authorized by: CORWIN Scott    Consent:     Consent obtained:  Verbal    Consent given by:  Patient  Universal protocol:     Procedure explained and questions answered to patient or proxy's satisfaction: yes      Patient identity confirmed:  Verbally with patient  Sedation:     Sedation type:  None  Anesthesia:     Anesthesia method:  None  Procedure details:     Indications: open wounds      Wound location:  Foot    Foot location:  Top of R foot    Wound age (days):  <1    Wound surface area (sq cm):  8  Dressing:     Dressing applied:  Telfa pad    Wrapped with:  Coban 2 inch  Post-procedure details:     Procedure completion:  Tolerated  Comments:      Hibiclens and water used to cleanse ventral surface right foot, followed by Bacitracin and application of sterile dressing. No FB present in wound.       Point of Care Test & Imaging Results from this visit  No results found for this visit on 03/05/25.   No results found.    Diagnostic study results (if any) were reviewed by CORWIN Scott.    Assessment/Plan   Allergies, medications, history, and pertinent labs/EKGs/Imaging reviewed by CORWIN Scott.     Medical Decision Making    History and physical exam consistent with sprain of left foot and right foot abrasion. No evidence of fracture or ligament rupture.??Xray of left ankle and foot did not show an acute fracture or dislocation.  Wound care was performed right foot as documented in procedure note and tetanus was updated.  Infection precautions were provided. Plan is for rest, heat/ice,  compression and oral NSAIDS. Encouraged follow-up with PCP or Ortho if pain worsens or is not improving in 1 week. Discussed when to seek emergent care. Patient agreeable with plan and verbalized understanding.?     Testing: Xray left foot/ankle    Treatment: Wound care and antibiotic ointment, RICE, and OTC analgesics    Differential: 1) Fracture, 2) Dislocation , 3) Sprain    Impression: Left foot sprain, abrasion right foot    We discussed with the patient our clinical thoughts at this time given the above findings and clinical assessment and we had a shared decision-making conversation in a patient-centered decision-making model on how to proceed forward. The patient was instructed on the importance of a close follow-up with PCP and other care providers. The patient was also advised that an Urgent care diagnosis is often a preliminary impression and that definitive care is often not able to be given completley in the Urgent care setting.     At time of discharge patient was clinically well-appearing and HDS for outpatient management. The patient was educated regarding diagnosis, supportive care, OTC and Rx medications. The patient was given the opportunity to ask questions prior to discharge.  They verbalized understanding of my discussion of the plans for treatment, expected course, indications to return to  or seek further evaluation in ED, and the need for timely follow up as directed.   They were provided with a work/school excuse if requested.       Orders and Diagnoses  Diagnoses and all orders for this visit:  Foot injury, left, initial encounter  -     XR foot left 3+ views; Future  Injury of left ankle, initial encounter  -     XR ankle left 3+ views; Future  Abrasion, right foot, initial encounter  -     bacitracin ointment 1 Application  Other orders  -     Tdap vaccine, age 7 years and older  (BOOSTRIX)      Medical Admin Record      Patient disposition: Home    Electronically signed by Bar MARRERO  SLOANE Malin-CNP  10:40 AM

## 2025-04-07 DIAGNOSIS — I47.10 SVT (SUPRAVENTRICULAR TACHYCARDIA) (CMS-HCC): ICD-10-CM

## 2025-04-07 RX ORDER — NADOLOL 20 MG/1
10 TABLET ORAL DAILY
Qty: 45 TABLET | Refills: 3 | Status: SHIPPED | OUTPATIENT
Start: 2025-04-07

## 2025-04-07 RX ORDER — NADOLOL 20 MG/1
10 TABLET ORAL DAILY
Qty: 45 TABLET | Refills: 0 | Status: SHIPPED | OUTPATIENT
Start: 2025-04-07

## 2025-08-05 ENCOUNTER — APPOINTMENT (OUTPATIENT)
Dept: PRIMARY CARE | Facility: CLINIC | Age: 40
End: 2025-08-05
Payer: COMMERCIAL

## 2025-08-19 ENCOUNTER — APPOINTMENT (OUTPATIENT)
Dept: PRIMARY CARE | Facility: CLINIC | Age: 40
End: 2025-08-19
Payer: COMMERCIAL

## 2025-10-20 ENCOUNTER — APPOINTMENT (OUTPATIENT)
Dept: PRIMARY CARE | Facility: CLINIC | Age: 40
End: 2025-10-20
Payer: COMMERCIAL

## 2025-10-22 ENCOUNTER — APPOINTMENT (OUTPATIENT)
Dept: DERMATOLOGY | Facility: CLINIC | Age: 40
End: 2025-10-22
Payer: COMMERCIAL

## 2025-11-04 ENCOUNTER — APPOINTMENT (OUTPATIENT)
Dept: PRIMARY CARE | Facility: CLINIC | Age: 40
End: 2025-11-04
Payer: COMMERCIAL